# Patient Record
Sex: FEMALE | Race: BLACK OR AFRICAN AMERICAN | Employment: PART TIME | URBAN - METROPOLITAN AREA
[De-identification: names, ages, dates, MRNs, and addresses within clinical notes are randomized per-mention and may not be internally consistent; named-entity substitution may affect disease eponyms.]

---

## 2019-10-07 ENCOUNTER — OFFICE VISIT (OUTPATIENT)
Dept: SURGERY | Age: 29
End: 2019-10-07

## 2019-10-07 VITALS
DIASTOLIC BLOOD PRESSURE: 84 MMHG | BODY MASS INDEX: 43.47 KG/M2 | RESPIRATION RATE: 20 BRPM | WEIGHT: 277 LBS | SYSTOLIC BLOOD PRESSURE: 145 MMHG | HEIGHT: 67 IN | HEART RATE: 87 BPM | TEMPERATURE: 97.2 F

## 2019-10-07 DIAGNOSIS — E11.9 CONTROLLED TYPE 2 DIABETES MELLITUS WITHOUT COMPLICATION, WITH LONG-TERM CURRENT USE OF INSULIN (HCC): ICD-10-CM

## 2019-10-07 DIAGNOSIS — E66.01 MORBID OBESITY (HCC): Primary | ICD-10-CM

## 2019-10-07 DIAGNOSIS — Z79.4 CONTROLLED TYPE 2 DIABETES MELLITUS WITHOUT COMPLICATION, WITH LONG-TERM CURRENT USE OF INSULIN (HCC): ICD-10-CM

## 2019-10-07 DIAGNOSIS — I10 ESSENTIAL HYPERTENSION: ICD-10-CM

## 2019-10-07 RX ORDER — LISINOPRIL 5 MG/1
TABLET ORAL
COMMUNITY
End: 2020-01-13 | Stop reason: CLARIF

## 2019-10-07 RX ORDER — OMEPRAZOLE 20 MG/1
20 TABLET, DELAYED RELEASE ORAL
COMMUNITY
Start: 2019-09-07 | End: 2019-10-07

## 2019-10-07 RX ORDER — METFORMIN HYDROCHLORIDE 500 MG/1
TABLET, EXTENDED RELEASE ORAL
Refills: 3 | COMMUNITY
Start: 2019-09-16

## 2019-10-07 RX ORDER — INSULIN LISPRO 100 [IU]/ML
INJECTION, SOLUTION INTRAVENOUS; SUBCUTANEOUS
COMMUNITY
Start: 2019-05-14 | End: 2019-10-07

## 2019-10-07 RX ORDER — LISINOPRIL 10 MG/1
TABLET ORAL
COMMUNITY
Start: 2018-05-09 | End: 2019-10-07

## 2019-10-07 RX ORDER — FLUCONAZOLE 150 MG/1
TABLET ORAL
COMMUNITY
End: 2019-10-07

## 2019-10-07 RX ORDER — ALBUTEROL SULFATE 0.83 MG/ML
SOLUTION RESPIRATORY (INHALATION)
COMMUNITY

## 2019-10-07 RX ORDER — GABAPENTIN 100 MG/1
CAPSULE ORAL
Refills: 5 | COMMUNITY
Start: 2019-09-19 | End: 2020-11-05 | Stop reason: DRUGHIGH

## 2019-10-07 RX ORDER — IBUPROFEN 200 MG
200 TABLET ORAL
COMMUNITY
End: 2019-10-07

## 2019-10-07 RX ORDER — INSULIN GLARGINE 100 [IU]/ML
INJECTION, SOLUTION SUBCUTANEOUS
Refills: 5 | COMMUNITY
Start: 2019-09-18

## 2019-10-07 RX ORDER — BUPROPION HYDROCHLORIDE 300 MG/1
TABLET ORAL
COMMUNITY
End: 2019-10-07

## 2019-10-07 RX ORDER — IPRATROPIUM BROMIDE AND ALBUTEROL SULFATE 2.5; .5 MG/3ML; MG/3ML
3 SOLUTION RESPIRATORY (INHALATION)
COMMUNITY
Start: 2017-12-28 | End: 2019-10-07

## 2019-10-07 NOTE — LETTER
10/7/19 Patient: Julio César Casas  
YOB: 1990 Date of Visit: 10/7/2019 Marshal Felix MD 
[address] [place] [address] [address] [place] [address] VIA Facsimile: [phone] Dear Marshal Felix MD, Thank you for referring Ms. Julio César Casas to [facility] for [facility]. My notes for this consultation are attached. If you have questions, please do not hesitate to call me. I look forward to following your patient along with you.  
 
 
Sincerely, 
 
Ana Luevano MD

## 2019-10-07 NOTE — PROGRESS NOTES
Deborah Browne is a 26 y.o. female who presents today with   Chief Complaint   Patient presents with    Morbid Obesity     Consult        Body mass index is 43.38 kg/m². 1. Have you been to the ER, urgent care clinic since your last visit? Hospitalized since your last visit? No    2. Have you seen or consulted any other health care providers outside of the [address] [place] [address] since your last visit? Include any pap smears or colon screening.  No

## 2019-10-07 NOTE — PROGRESS NOTES
Initial Consultation for Bariatric Surgery Template (Gastric Bypass)    Jose Buenrostro is a 26 y.o. female who comes into the office today for initial consultation for the surgical options for the treatment of morbid obesity. The patient initially identified obesity at the age of 14 and at age 24 weighed 0 lbs. She has tried a variety of unsupervised weight-loss attempts including self imposed, but has yet to meet with lasting success. Maximum weight lost on a diet is about 5 lbs, but that the weight loss always seems to return. Today, the patient is  Height: 5' 7\" (170.2 cm) tall, Weight: 125.6 kg (277 lb) lbs for a Body mass index is 43.38 kg/m². It is due to the patient's severe obesity, which is further complicated by adult onset diabetes mellitus, hypertension, reactive airway disease and neuropathy  that the patient is now seeking out bariatric surgery, specifically, gastric  bypass.     Past Medical History:   Diagnosis Date    Asthma     Diabetes ([address]     Hypertension     Neuropathy        Past Surgical History:   Procedure Laterality Date    HX  SECTION         Current Outpatient Medications on File Prior to Visit   Medication Sig Dispense Refill    albuterol (PROVENTIL VENTOLIN) 2.5 mg /3 mL (0.083 %) nebu albuterol sulfate 2.5 mg/3 mL (0.083 %) solution for nebulization      gabapentin (NEURONTIN) 100 mg capsule TAKE ONE CAPSULE BY MOUTH 3 TIMES A DAY  5    LANTUS SOLOSTAR U-100 INSULIN 100 unit/mL (3 mL) inpn INJECT 20 UNITS IN THE MORNING AND 30 UNITS IN THE EVENING  5    lisinopril (PRINIVIL, ZESTRIL) 5 mg tablet lisinopril 5 mg tablet      metFORMIN ER (GLUCOPHAGE XR) 500 mg tablet TAKE 2 TABLETS BY MOUTH TWICE A DAY  3    buPROPion XL (WELLBUTRIN XL) 300 mg XL tablet bupropion HCl  mg 24 hr tablet, extended release      dulaglutide (TRULICITY) 0.33 XS/1.2 mL sub-q pen Trulicity 1.96 HT/5.9 mL subcutaneous pen injector      insulin lispro (HUMALOG U-100 INSULIN) 100 unit/mL injection Humalog U-100 Insulin 100 unit/mL subcutaneous solution      lisinopril (PRINIVIL, ZESTRIL) 10 mg tablet lisinopril 10 mg tablet       No current facility-administered medications on file prior to visit.         Allergies   Allergen Reactions    Shellfish Derived Anaphylaxis       Social History     Tobacco Use    Smoking status: Former Smoker     Last attempt to quit: 10/1/2018     Years since quittin.0    Smokeless tobacco: Never Used   Substance Use Topics    Alcohol use: Not Currently    Drug use: Not on file       Family History   Problem Relation Age of Onset    Diabetes Mother     Hypertension Mother    [facility] Diabetes Father     Hypertension Father        Family Status   Relation Name Status    Mother  Alive    Father  Alive       Review of Systems:  Positive in BOLD    CONST: Fever, weight loss, fatigue or chills  GI: Nausea, vomiting, abdominal pain, change in bowel habits, hematochezia, melena, and GERD   INTEG: Dermatitis, abnormal moles  HEENT: Recent changes in vision, vertigo, epistaxis, dysphagia and hoarseness  CV: Chest pain, palpitations, HTN, edema and varicosities  RESP: Cough, shortness of breath, wheezing, hemoptysis, snoring and reactive airway disease  : Hematuria, dysuria, frequency, urgency, nocturia and stress urinary incontinence   MS: Weakness, joint pain and arthritis  ENDO: Diabetes, thyroid disease, polyuria, polydipsia, polyphagia, poor wound healing, heat intolerance, cold intolerance  LYMPH/HEME: Anemia, bruising and history of blood transfusions  NEURO: Dizziness, headache, fainting, seizures and stroke, neuropathy  PSYCH: Anxiety and depression      Physical Exam    Visit Vitals  /84 (BP 1 Location: Left arm, BP Patient Position: At rest)   Pulse 87   Temp 97.2 °F (36.2 °C) (Oral)   Resp 20   Ht 5' 7\" (1.702 m)   Wt 125.6 kg (277 lb)   BMI 43.38 kg/m²       Pre op weight: 277  EBW: 137  Wt loss to date: 0       General: 26 y.o.) female in no acute distress. Morbidly obese in abdomen - mixed pattern  HEENT: Normocephalic, atraumatic, Pupils equal and reactive, nasopharynx clear, oropharynx clear and moist without lesions  NECK: Supple, no lymphadenopathy, thyromegaly, carotid bruits or jugular venous distension. trachea midline  RESP: Clear to auscultation bilaterally, no wheezes, rhonchi, or rales, normal respiratory excursion  CV: Regular rate and rhythm, no murmurs, rubs or gallops. 3+/4 pulses in bilateral dorsalis pedis and posterior tibialis. No distal edema or varicosities. ABD: Soft, nontender, nondistended, normoactive bowel sounds, no hernias, no hepatosplenomegaly, easily palpable costal margins, gynecoid distribution, healed pfannenstiel incision   Extremities: Warm, well perfused, no tenderness or swelling, normal gait/station  Neuro: Sensation and strength grossly intact and symmetrical  Psych: Alert and oriented to person, place, and time. Impression:    Deborah Browne is a 26 y.o. female who is suffering from morbid obesity with a BMI of 43  and comorbidities including adult onset diabetes mellitus, hypertension and reactive airway disease  who would benefit from bariatric surgery. We have had an extensive discussion with regard to the risks, benefits and likely outcomes of the operation. We've discussed the restrictive and malabsorptive nature of the gastric bypass and compared and contrasted with the sleeve gastrectomy. The patient understands the likelihood of losing approximately 80% of their excess weight in 12 to 18 months. She also understands the risks including but not limited to bleeding, infection, need for reoperation, ulcers, leaks and strictures, bowel obstruction secondary to adhesions and internal hernias, DVT, PE, heart attack, stroke, and death. Patient also understands risks of inadequate weight loss, excess weight loss, vitamin insufficiency, protein malnutrition, excess skin, and loss of hair.   We have reviewed the components of a successful postoperative course including requirement for a high protein, low carbohydrate diet, 60 oz a day of zero calorie liquids, daily vitamin supplementation, daily exercise, regular follow-up, and participation in support groups. At this time we will enroll the patient in our bariatric program, undertake routine laboratory evaluation, chest X-ray, EKG, possible UGI and evaluation by  nutritionist as well as psychologist and pending their satisfactory completion of the preop evaluation, plan to perform a gastric bypass.

## 2019-10-29 ENCOUNTER — DOCUMENTATION ONLY (OUTPATIENT)
Dept: SURGERY | Age: 29
End: 2019-10-29

## 2019-10-29 NOTE — PROGRESS NOTES
[facility] [place] [facility] [address] [facility]  [id number] [place] [address] 88  [place], [address] [place] [address]    Patient's Name: Jadiel Green   Age: 26 y.o. YOB: 1990   Sex: female    Date:  10/09/2019    Session: 1 of  6  Surgeon:  Dr. Rosa Dougherty    Height: 5'7\" Weight:    275      Lbs. BMI: 43     Starting Weight: 277       Lbs. Do you smoke? No    Alcohol intake:    I do not drink at all. Class Guidelines    Guidelines are reviewed with patient at the start of every class. 1. Patient understands that weight loss trial classes must be consecutive. Patient understands if they miss a class, it is their responsibility to contact me to reschedule class. I will reach out to patient after their first no show. 2.  Patient understands the expectations that weight maintenance/weight loss is expected during the classes. Failure to demonstrate changes may result in one extra month of weight loss trial, followed by going back to see the surgeon. 3. Patient is also instructed to be doing their labs, blood work, psych visit, support group and any other test that the surgeon has used while they are working on their weight loss trial.  4. Patient is instructed to bring their education binder to all classes. Changes Made Since Last Class:   I have increased my activity. Eating Habits and Behaviors      Today we reviewed key diet principles. We talked about patient following a low calorie/low carbohydrate diet while they are in weight loss trials. To achieve this, patient is encouraged to avoid liquid calories, including alcohol, soda, sweet tea, and fruit juices. Patient can cut carbohydrates by trying to stick to meat and vegetables. Patient can also eat eggs, cheese, and good fat, while trying to eliminate starches, such as pasta, rice, crackers, chips, popcorn.     Some of the food-related suggestions included drinking plenty of water or calorie-free beverages prior to their meal.  Patient is encouraged to to fill up on protein first, which is the ultimate fill-me up food. We talked about the importance of eating breakfast and the effects that can happen if one skips meals, which includes eating larger portions, snacking more, and decreasing their metabolism. With the suggestions in the power point, patient will be able to decrease their calories and carbohydrate intake. Patient's current diet habits include:   3 meals consisting of eggs, sausage, cheese, hot dogs, chicken, vegetables. Physical Activity/Exercise    Comments: We talked about the importance of establishing a work out routine. Patient is currently doing treadmill walking for activity. Behavior Modification       Comments:   Some of the behavior tips that were included in the power point, include being choosy about night time snacking. Patient was encouraged to make the TV a no eating zone and not eat after 7 pm.  Patient is also encouraged to keep a food journal.      I also reminded all patients to make their psychologist appointment and attend at least 1 support group. Goals for the next month include: Increase activity to three times per week. Decrease portion size and decrease high sugar cake eating.     Mary Young RD

## 2019-11-18 ENCOUNTER — OFFICE VISIT (OUTPATIENT)
Dept: SURGERY | Age: 29
End: 2019-11-18

## 2019-11-18 VITALS
DIASTOLIC BLOOD PRESSURE: 76 MMHG | SYSTOLIC BLOOD PRESSURE: 137 MMHG | HEIGHT: 67 IN | HEART RATE: 95 BPM | WEIGHT: 274 LBS | TEMPERATURE: 97.4 F | OXYGEN SATURATION: 98 % | BODY MASS INDEX: 43 KG/M2

## 2019-11-18 DIAGNOSIS — E66.01 MORBID OBESITY (HCC): Primary | ICD-10-CM

## 2019-11-18 DIAGNOSIS — E11.9 CONTROLLED TYPE 2 DIABETES MELLITUS WITHOUT COMPLICATION, WITH LONG-TERM CURRENT USE OF INSULIN (HCC): ICD-10-CM

## 2019-11-18 DIAGNOSIS — I10 ESSENTIAL HYPERTENSION: ICD-10-CM

## 2019-11-18 DIAGNOSIS — Z79.4 CONTROLLED TYPE 2 DIABETES MELLITUS WITHOUT COMPLICATION, WITH LONG-TERM CURRENT USE OF INSULIN (HCC): ICD-10-CM

## 2019-11-18 RX ORDER — INSULIN LISPRO 100 [IU]/ML
INJECTION, SOLUTION INTRAVENOUS; SUBCUTANEOUS
COMMUNITY

## 2019-11-18 NOTE — PROGRESS NOTES
Johnnie Paul is a 26 y.o. female (: 1990) presenting to address:    Chief Complaint   Patient presents with    Weight Management     GBP       Medication list and allergies have been reviewed with Johnnie Palu and updated as of today's date. I have gone over all Medical, Surgical and Social History with Johnnie Paul and updated/added the information accordingly. 1. Have you been to the ER, Urgent Care or Hospitalized since your last visit? Yes. [facility] for UTI        2. Have you followed up with your PCP or any other Physicians since your procedure/ last office visit?    YES

## 2019-11-25 ENCOUNTER — DOCUMENTATION ONLY (OUTPATIENT)
Dept: SURGERY | Age: 29
End: 2019-11-25

## 2019-11-25 NOTE — PROGRESS NOTES
Bariatric Preoperative Progress Note      Subjective:     Karlo Lo is a 26 y.o. female who presents today for followup of their candidacy for bariatric surgery. It is due to the patient's severe obesity, which is further complicated by adult onset diabetes mellitus, hypertension, reactive airway disease and neuropathy  that the patient is now seeking out bariatric surgery. Since last seen, Karlo Lo has been working through bariatric program towards gastric bypass. Past Medical History:   Diagnosis Date    Asthma     Diabetes ([address]     Hypertension     Neuropathy        Past Surgical History:   Procedure Laterality Date    HX  SECTION         Current Outpatient Medications   Medication Sig Dispense Refill    insulin lispro (HUMALOG U-100 INSULIN) 100 unit/mL injection by SubCUTAneous route.       albuterol (PROVENTIL VENTOLIN) 2.5 mg /3 mL (0.083 %) nebu albuterol sulfate 2.5 mg/3 mL (0.083 %) solution for nebulization      gabapentin (NEURONTIN) 100 mg capsule TAKE ONE CAPSULE BY MOUTH 3 TIMES A DAY  5    LANTUS SOLOSTAR U-100 INSULIN 100 unit/mL (3 mL) inpn INJECT 20 UNITS IN THE MORNING AND 30 UNITS IN THE EVENING  5    metFORMIN ER (GLUCOPHAGE XR) 500 mg tablet TAKE 2 TABLETS BY MOUTH TWICE A DAY  3    lisinopril (PRINIVIL, ZESTRIL) 5 mg tablet lisinopril 5 mg tablet         Allergies   Allergen Reactions    Shellfish Derived Anaphylaxis       Review of Systems:  Positive in BOLD     CONST: Fever, weight loss, fatigue or chills  GI: Nausea, vomiting, abdominal pain, change in bowel habits, hematochezia, melena, and GERD   INTEG: Dermatitis, abnormal moles  HEENT: Recent changes in vision, vertigo, epistaxis, dysphagia and hoarseness  CV: Chest pain, palpitations, HTN, edema and varicosities  RESP: Cough, shortness of breath, wheezing, hemoptysis, snoring and reactive airway disease  : Hematuria, dysuria, frequency, urgency, nocturia and stress urinary incontinence   MS: Weakness, joint pain and arthritis  ENDO: Diabetes, thyroid disease, polyuria, polydipsia, polyphagia, poor wound healing, heat intolerance, cold intolerance  LYMPH/HEME: Anemia, bruising and history of blood transfusions  NEURO: Dizziness, headache, fainting, seizures and stroke, neuropathy  PSYCH: Anxiety and depression       Objective:     Physical Exam:  Visit Vitals  /76 (BP 1 Location: Right arm, BP Patient Position: Sitting)   Pulse 95   Temp 97.4 °F (36.3 °C) (Oral)   Ht 5' 7\" (1.702 m)   Wt 124.3 kg (274 lb)   LMP 11/14/2019   SpO2 98%   BMI 42.91 kg/m²       Physical Exam    Studies to date:    Labs:needs to be preformed/obtained       EKG: needs to be preformed/obtained       Nutritional evaluation: 1/6     Psychiatric evaluation:pending     Support Group: needs to be preformed/obtained       Additional evaluations:  N/a at this time     Assessment:   Nadir Ackerman is a 26 y.o. female who is progressing through the bariatric preoperative evaluation. Ms. Jammie Marshall has a reminder for a \"due or due soon\" health maintenance. I have asked that she contact her primary care provider for follow-up on this health maintenance. Plan:   -complete remainder of preop evaluation, as above   - patient communicates understanding that the expectation is to lose or maintain weight during WLT. Weight gain may result in delay or cancellation of surgery.   -Follow up once has completed entirety of weight loss workup to determine next steps.         Follow up Juan Antonio    >50% of 30 min visit spent counseling     DAYAN Graham-BC

## 2019-11-25 NOTE — PROGRESS NOTES
[facility]  [address], [address] [facility]    Patient's Name: Luh López   Age: 26 y.o. YOB: 1990   Sex: female    Date:  11/13/2019    Session: 2 of  6  Surgeon:  Dr. Joe Damon    Height: 5'7\" Weight:    275  Lbs. BMI: 42       Starting Weight:  277 Lbs. Do you smoke? no    Alcohol intake:    I do not drink at all. Class Guidelines    Guidelines are reviewed with patient at the start of every class. 1. Patient understands that weight loss trial classes must be consecutive. Patient understands if they miss a class, it is their responsibility to contact me to reschedule class. I will reach out to patient after their first no show. 2.  Patient understands the expectations that weight maintenance/weight loss is expected during the classes. Failure to demonstrate changes may result in one extra month of weight loss trial, followed by going back to see the surgeon. 3. Patient is also instructed to be doing their labs, blood work, psych visit, support group and any other test that the surgeon has used while they are working on their weight loss trial.        Changes Made Since Last Class:   Fewer carbs, more protein , more water. Dietary Instruction    During today's class we continued to focus on the key diet principles. Patient was instructed to follow a low carbohydrate diet, focusing on meat and vegetables. Patient was instructed to stop liquid calories and aim for 64 ounces of water per day. In class, I also gave patient a power point on surviving the holidays. Some of the tips included survival tips for parties, including bringing their own low carbohydrate dish to a potluck dinner and surveying the buffet line before they start filling up their plate.   Patient was given cooking alternatives, including using Splenda or Stevia for sugar, substituting applesauce for oil in recipes, and using low fat plain yogurt instead of sour cream in dips. Patient was also encouraged to be mindful of calories in alcohol. Patient's diet habits include:   3 meals consisting of protein shake, egg, tuna, chicken, vegetables. Physical Activity/Exercise    Patient is currently doing more walking on treadmill for activity. Today's power point on surviving the holidays also included tips on exercising. This included being creative during the holiday, walking stairs, mall walking, getting resistance bands. Patient was encouraged not to be afraid to excuse themselves from the table to go for a walk after they eat. Behavior Modification    Reinforced behavior changes to make. Patient was encouraged to keep their emotions in check. Try to HALT and focus on whether they are eating out of hunger or if they are eating out of emotions. Other eating behaviors included surveying the buffet line before starting to fill up their plate. Goals that patient set for next month include: Increase exercise routine to include 45 minutes 3x per week. Eat healthier and make better food choices by eating smaller portions.        Carl Das RD

## 2019-12-31 ENCOUNTER — DOCUMENTATION ONLY (OUTPATIENT)
Dept: SURGERY | Age: 29
End: 2019-12-31

## 2019-12-31 NOTE — PROGRESS NOTES
[employer] [facility] [place]                                                                  [facility]                                                               [address] [place] [address] [facility]                                                                   [place], [address] [place] [address]      Name: Kellie Magdaleno  : 1990      Date: 2019         Session:  3 of  6   Surgeon:   Dr. Walterine Sever     Height: 5'7\"   Weight:    270      Lbs. BMI: 42         Starting Weight: 277     Do you smoke? no    Alcohol intake:    Patient does not drink. Class Guidelines    Guidelines are reviewed with patient at the start of every class. 1. Patient understands that weight loss trial classes must be consecutive. Patient understands if they miss a class, it is their responsibility to contact me to reschedule class. I will reach out to patient after their first no show. 2.  Patient understands the expectations that weight maintenance/weight loss is expected during the classes. Failure to demonstrate changes may result in one extra month of weight loss trial, followed by going back to see the surgeon. Patient understands that they CAN NOT gain any weight during the weight loss trial.  Gaining weight will result in extra classes. 3. Patient is also instructed to be doing their labs, blood work, psych visit, support group and any other test that the surgeon has used while they are working on their weight loss trial.  4.  Patient was instructed to bring their blue binder to every class and appointment. Changes Made Since Last Class:   Self control over junk food. Eating Habits and Behaviors    Today in class, we reviewed the key diet principles. Specifically, patient was instructed to watch their carbohydrate intake. We talked about foods that have carbohydrates in them and alternatives in place of this.   Patient was encouraged to start cutting out bread, rice, pasta, and other starches. Patient is encouraged to work towards 64 ounces of fluid per day, avoiding soda, sweet tea, and fruit juices. We also discussed how to make the best choices when eating out. We looked at fast food traps and dining out strategies to stay in control. Patient was also given ideas from various restaurants that would be a healthier option. We also discussed vitamin protocol if patient pursues surgical weight loss. Patient's current diet habits include:  3 meals consisting of eggs, protein shake, chicken and vegetables. Physical Activity/Exercise    Comments: We talked about exercise. Patient was given reasons of why exercise is so important and how that can help with their long-term success. I have encouraged patient to get a support system to help with the activity. Currently for activity, patient is doing more walking. Behavior Modification       Comments:  I have also talked to patient about some behavior changes including taking 20-30 minutes to eat a meal.  Patient is encouraged to get a timer and use smaller utensils to help them stretch their meal out. Patient is also encouraged to get into a routine of not eating after 7 pm and make the TV a no eating zone. Goals that patient wants to work on includes:  1. Increase activity. 2. Decrease carbohydrate intake and portions sizes.       Garima Mora RD

## 2020-01-02 ENCOUNTER — HOSPITAL ENCOUNTER (OUTPATIENT)
Dept: PREADMISSION TESTING | Age: 30
Discharge: HOME | End: 2020-01-02
Payer: MEDICAID

## 2020-01-02 ENCOUNTER — HOSPITAL ENCOUNTER (OUTPATIENT)
Dept: LAB | Age: 30
Discharge: HOME | End: 2020-01-02
Payer: MEDICAID

## 2020-01-02 DIAGNOSIS — I10 ESSENTIAL HYPERTENSION: ICD-10-CM

## 2020-01-02 DIAGNOSIS — E66.01 MORBID OBESITY (HCC): ICD-10-CM

## 2020-01-02 DIAGNOSIS — E11.9 CONTROLLED TYPE 2 DIABETES MELLITUS WITHOUT COMPLICATION, WITH LONG-TERM CURRENT USE OF INSULIN (HCC): ICD-10-CM

## 2020-01-02 DIAGNOSIS — Z79.4 CONTROLLED TYPE 2 DIABETES MELLITUS WITHOUT COMPLICATION, WITH LONG-TERM CURRENT USE OF INSULIN (HCC): ICD-10-CM

## 2020-01-02 LAB
25(OH)D3 SERPL-MCNC: 18.6 NG/ML (ref 30–100)
ALBUMIN SERPL-MCNC: 3.5 G/DL (ref 3.4–5)
ANION GAP SERPL CALC-SCNC: 6 MMOL/L (ref 3–18)
ATRIAL RATE: 86 BPM
BASOPHILS # BLD: 0 K/UL (ref 0–0.1)
BASOPHILS NFR BLD: 0 % (ref 0–2)
BUN SERPL-MCNC: 11 MG/DL (ref 7–18)
BUN/CREAT SERPL: 15 (ref 12–20)
CALCIUM SERPL-MCNC: 9.1 MG/DL (ref 8.5–10.1)
CALCULATED P AXIS, ECG09: 53 DEGREES
CALCULATED R AXIS, ECG10: 52 DEGREES
CALCULATED T AXIS, ECG11: 17 DEGREES
CHLORIDE SERPL-SCNC: 105 MMOL/L (ref 100–111)
CO2 SERPL-SCNC: 27 MMOL/L (ref 21–32)
CREAT SERPL-MCNC: 0.72 MG/DL (ref 0.6–1.3)
DIAGNOSIS, 93000: NORMAL
DIFFERENTIAL METHOD BLD: ABNORMAL
EOSINOPHIL # BLD: 0.3 K/UL (ref 0–0.4)
EOSINOPHIL NFR BLD: 4 % (ref 0–5)
ERYTHROCYTE [DISTWIDTH] IN BLOOD BY AUTOMATED COUNT: 15.7 % (ref 11.6–14.5)
EST. AVERAGE GLUCOSE BLD GHB EST-MCNC: 232 MG/DL
FOLATE SERPL-MCNC: 13.5 NG/ML (ref 3.1–17.5)
GLUCOSE SERPL-MCNC: 155 MG/DL (ref 74–99)
HBA1C MFR BLD: 9.7 % (ref 4.2–5.6)
HCT VFR BLD AUTO: 39.1 % (ref 35–45)
HGB BLD-MCNC: 11.4 G/DL (ref 12–16)
IRON SERPL-MCNC: 92 UG/DL (ref 50–175)
LYMPHOCYTES # BLD: 2.3 K/UL (ref 0.9–3.6)
LYMPHOCYTES NFR BLD: 31 % (ref 21–52)
MCH RBC QN AUTO: 22.7 PG (ref 24–34)
MCHC RBC AUTO-ENTMCNC: 29.2 G/DL (ref 31–37)
MCV RBC AUTO: 77.9 FL (ref 74–97)
MONOCYTES # BLD: 0.4 K/UL (ref 0.05–1.2)
MONOCYTES NFR BLD: 5 % (ref 3–10)
NEUTS SEG # BLD: 4.5 K/UL (ref 1.8–8)
NEUTS SEG NFR BLD: 60 % (ref 40–73)
P-R INTERVAL, ECG05: 130 MS
PLATELET # BLD AUTO: 389 K/UL (ref 135–420)
PMV BLD AUTO: 10.9 FL (ref 9.2–11.8)
POTASSIUM SERPL-SCNC: 4.1 MMOL/L (ref 3.5–5.5)
Q-T INTERVAL, ECG07: 342 MS
QRS DURATION, ECG06: 76 MS
QTC CALCULATION (BEZET), ECG08: 409 MS
RBC # BLD AUTO: 5.02 M/UL (ref 4.2–5.3)
SODIUM SERPL-SCNC: 138 MMOL/L (ref 136–145)
TSH SERPL DL<=0.05 MIU/L-ACNC: 1.16 UIU/ML (ref 0.36–3.74)
VENTRICULAR RATE, ECG03: 86 BPM
VIT B12 SERPL-MCNC: 470 PG/ML (ref 211–911)
WBC # BLD AUTO: 7.5 K/UL (ref 4.6–13.2)

## 2020-01-02 PROCEDURE — 80048 BASIC METABOLIC PNL TOTAL CA: CPT

## 2020-01-02 PROCEDURE — 84425 ASSAY OF VITAMIN B-1: CPT

## 2020-01-02 PROCEDURE — 82040 ASSAY OF SERUM ALBUMIN: CPT

## 2020-01-02 PROCEDURE — 83540 ASSAY OF IRON: CPT

## 2020-01-02 PROCEDURE — 93005 ELECTROCARDIOGRAM TRACING: CPT

## 2020-01-02 PROCEDURE — 83036 HEMOGLOBIN GLYCOSYLATED A1C: CPT

## 2020-01-02 PROCEDURE — 82607 VITAMIN B-12: CPT

## 2020-01-02 PROCEDURE — 82306 VITAMIN D 25 HYDROXY: CPT

## 2020-01-02 PROCEDURE — 84443 ASSAY THYROID STIM HORMONE: CPT

## 2020-01-02 PROCEDURE — 85025 COMPLETE CBC W/AUTO DIFF WBC: CPT

## 2020-01-02 PROCEDURE — 36415 COLL VENOUS BLD VENIPUNCTURE: CPT

## 2020-01-02 PROCEDURE — 86677 HELICOBACTER PYLORI ANTIBODY: CPT

## 2020-01-03 LAB — H PYLORI IGM SER-ACNC: 12.1 UNITS (ref 0–8.9)

## 2020-01-04 LAB — VIT B1 BLD-SCNC: 101.4 NMOL/L (ref 66.5–200)

## 2020-01-06 LAB — H PYLORI IGG SER IA-ACNC: 0.46 INDEX VALUE (ref 0–0.79)

## 2020-01-13 ENCOUNTER — OFFICE VISIT (OUTPATIENT)
Dept: SURGERY | Age: 30
End: 2020-01-13

## 2020-01-13 VITALS
HEART RATE: 89 BPM | TEMPERATURE: 98.1 F | SYSTOLIC BLOOD PRESSURE: 152 MMHG | BODY MASS INDEX: 43.32 KG/M2 | DIASTOLIC BLOOD PRESSURE: 80 MMHG | HEIGHT: 67 IN | RESPIRATION RATE: 20 BRPM | WEIGHT: 276 LBS

## 2020-01-13 DIAGNOSIS — I10 ESSENTIAL HYPERTENSION: ICD-10-CM

## 2020-01-13 DIAGNOSIS — A04.8 H. PYLORI INFECTION: ICD-10-CM

## 2020-01-13 DIAGNOSIS — E55.9 VITAMIN D DEFICIENCY: ICD-10-CM

## 2020-01-13 DIAGNOSIS — E66.01 MORBID OBESITY (HCC): Primary | ICD-10-CM

## 2020-01-13 DIAGNOSIS — Z79.4 CONTROLLED TYPE 2 DIABETES MELLITUS WITHOUT COMPLICATION, WITH LONG-TERM CURRENT USE OF INSULIN (HCC): ICD-10-CM

## 2020-01-13 DIAGNOSIS — E11.9 CONTROLLED TYPE 2 DIABETES MELLITUS WITHOUT COMPLICATION, WITH LONG-TERM CURRENT USE OF INSULIN (HCC): ICD-10-CM

## 2020-01-13 RX ORDER — IPRATROPIUM BROMIDE AND ALBUTEROL SULFATE 2.5; .5 MG/3ML; MG/3ML
3 SOLUTION RESPIRATORY (INHALATION)
COMMUNITY
Start: 2017-12-28 | End: 2020-01-13 | Stop reason: CLARIF

## 2020-01-13 RX ORDER — CITALOPRAM 20 MG/1
TABLET, FILM COATED ORAL
COMMUNITY
Start: 2020-01-04 | End: 2020-11-05 | Stop reason: DRUGHIGH

## 2020-01-13 RX ORDER — OMEPRAZOLE 20 MG/1
TABLET, DELAYED RELEASE ORAL
COMMUNITY
End: 2020-11-05 | Stop reason: DRUGHIGH

## 2020-01-13 RX ORDER — EXENATIDE 2 MG/.65ML
INJECTION, SUSPENSION, EXTENDED RELEASE SUBCUTANEOUS
COMMUNITY
Start: 2020-01-08

## 2020-01-13 RX ORDER — LOSARTAN POTASSIUM 100 MG/1
TABLET ORAL
COMMUNITY
Start: 2020-01-04

## 2020-01-13 NOTE — PATIENT INSTRUCTIONS
Vitamin D deficiency: Start Vit D 3 10,000 units daily PO    A1C goal 8 prior to sx       Recent Results (from the past 336 hour(s))   ALBUMIN    Collection Time: 01/02/20 11:17 AM   Result Value Ref Range    Albumin 3.5 3.4 - 5.0 g/dL   CBC WITH AUTOMATED DIFF    Collection Time: 01/02/20 11:17 AM   Result Value Ref Range    WBC 7.5 4.6 - 13.2 K/uL    RBC 5.02 4.20 - 5.30 M/uL    HGB 11.4 (L) 12.0 - 16.0 g/dL    HCT 39.1 35.0 - 45.0 %    MCV 77.9 74.0 - 97.0 FL    MCH 22.7 (L) 24.0 - 34.0 PG    MCHC 29.2 (L) 31.0 - 37.0 g/dL    RDW 15.7 (H) 11.6 - 14.5 %    PLATELET 331 748 - 399 K/uL    MPV 10.9 9.2 - 11.8 FL    NEUTROPHILS 60 40 - 73 %    LYMPHOCYTES 31 21 - 52 %    MONOCYTES 5 3 - 10 %    EOSINOPHILS 4 0 - 5 %    BASOPHILS 0 0 - 2 %    ABS. NEUTROPHILS 4.5 1.8 - 8.0 K/UL    ABS. LYMPHOCYTES 2.3 0.9 - 3.6 K/UL    ABS. MONOCYTES 0.4 0.05 - 1.2 K/UL    ABS. EOSINOPHILS 0.3 0.0 - 0.4 K/UL    ABS.  BASOPHILS 0.0 0.0 - 0.1 K/UL    DF AUTOMATED     H PYLORI AB, IGG, QT    Collection Time: 01/02/20 11:17 AM   Result Value Ref Range    H. pylori Ab, IgG 0.46 0.00 - 0.79 Index Value   H PYLORI AB, IGM    Collection Time: 01/02/20 11:17 AM   Result Value Ref Range    H. pylori Ab, IgM 12.1 (H) 0.0 - 8.9 units   HEMOGLOBIN A1C WITH EAG    Collection Time: 01/02/20 11:17 AM   Result Value Ref Range    Hemoglobin A1c 9.7 (H) 4.2 - 5.6 %    Est. average glucose 232 mg/dL   IRON    Collection Time: 01/02/20 11:17 AM   Result Value Ref Range    Iron 92 50 - 732 ug/dL   METABOLIC PANEL, BASIC    Collection Time: 01/02/20 11:17 AM   Result Value Ref Range    Sodium 138 136 - 145 mmol/L    Potassium 4.1 3.5 - 5.5 mmol/L    Chloride 105 100 - 111 mmol/L    CO2 27 21 - 32 mmol/L    Anion gap 6 3.0 - 18 mmol/L    Glucose 155 (H) 74 - 99 mg/dL    BUN 11 7.0 - 18 MG/DL    Creatinine 0.72 0.6 - 1.3 MG/DL    BUN/Creatinine ratio 15 12 - 20      GFR est AA >60 >60 ml/min/1.73m2    GFR est non-AA >60 >60 ml/min/1.73m2    Calcium 9.1 8.5 - 10.1 MG/DL   TSH 3RD GENERATION    Collection Time: 01/02/20 11:17 AM   Result Value Ref Range    TSH 1.16 0.36 - 3.74 uIU/mL   VITAMIN B1, WHOLE BLOOD    Collection Time: 01/02/20 11:17 AM   Result Value Ref Range    Vitamin B1 101.4 66.5 - 200.0 nmol/L   VITAMIN B12 & FOLATE    Collection Time: 01/02/20 11:17 AM   Result Value Ref Range    Vitamin B12 470 211 - 911 pg/mL    Folate 13.5 3.10 - 17.50 ng/mL   VITAMIN D, 25 HYDROXY    Collection Time: 01/02/20 11:17 AM   Result Value Ref Range    Vitamin D 25-Hydroxy 18.6 (L) 30 - 100 ng/mL   EKG, 12 LEAD, INITIAL    Collection Time: 01/02/20 11:21 AM   Result Value Ref Range    Ventricular Rate 86 BPM    Atrial Rate 86 BPM    P-R Interval 130 ms    QRS Duration 76 ms    Q-T Interval 342 ms    QTC Calculation (Bezet) 409 ms    Calculated P Axis 53 degrees    Calculated R Axis 52 degrees    Calculated T Axis 17 degrees    Diagnosis       Normal sinus rhythm  Cannot rule out Anterior infarct , age undetermined  Abnormal ECG  No previous ECGs available  Confirmed by Altagracia Schuler ([id number]) on 1/2/2020 3:01:07 PM            H. Pylori Bacterial Infection: Care Instructions  Your Care Instructions    Your test shows the presence of Helicobacter pylori ( H. pylori), a kind of bacterium that lives in the lining of the stomach. Many people have H. pylori in their stomachs and do not develop problems. But sometimes H. pylori causes an upset stomach or a sore (ulcer) in the stomach lining. Most stomach ulcers are caused by H. pylori. Symptoms of an ulcer include gnawing or burning pain in the belly that can last minutes or hours. Eating food or taking antacids helps relieve the pain, but the symptoms may come back after a while. Antibiotic medicine can cure an H. pylori infection. Follow-up care is a key part of your treatment and safety. Be sure to make and go to all appointments, and call your doctor if you are having problems.  It's also a good idea to know your test results and keep a list of the medicines you take. How can you care for yourself at home? · Take your antibiotics as directed. Do not stop taking them just because you feel better. You need to take the full course of antibiotics. · If your doctor prescribes other medicine, take it exactly as prescribed. Call your doctor if you think you are having a problem with your medicine. You will get more details on the specific medicine your doctor prescribes. · Eat a healthy, balanced diet. ? Eat smaller meals, and eat more often. Be sure to eat at least three meals a day. ? Avoid heavily spiced or greasy foods. ? Do not drink beverages that have caffeine if they bother your stomach. These include coffee, tea, and soda. · Do not smoke. Smoking slows the healing of your ulcer and can make an ulcer come back. If you need help quitting, talk to your doctor about stop-smoking programs and medicines. These can increase your chances of quitting for good. · Limit how much alcohol you drink. Alcohol can slow healing of an ulcer and can make your symptoms worse. · Wash your hands after going to the bathroom. · Avoid aspirin, ibuprofen, or other anti-inflammatory medicines, because they can irritate the stomach. If you need pain medicine, try acetaminophen (Tylenol). When should you call for help? Call 911 anytime you think you may need emergency care. For example, call if:    · You vomit blood or what looks like coffee grounds.     · Your stools are maroon or very bloody.    Call your doctor now or seek immediate medical care if:    · You have new or worse belly pain.     · You are vomiting.     · Your stools are black and look like tar, or they have streaks of blood.    Watch closely for changes in your health, and be sure to contact your doctor if:    · You do not get better as expected. Where can you learn more? Go to http://tashi-mari.info/. Enter S623 in the search box to learn more about \"H.  Pylori Bacterial Infection: Care Instructions. \"  Current as of: November 7, 2018  Content Version: 12.2  © 1077-7646 [employer]. Care instructions adapted under license by [employer] (which disclaims liability or warranty for this information). If you have questions about a medical condition or this instruction, always ask your healthcare professional. [place] [address] any warranty or liability for your use of this information.

## 2020-01-13 NOTE — PROGRESS NOTES
Tyrone Kline is a 26 y.o. female who presents today with   Chief Complaint   Patient presents with    Morbid Obesity     Mid trial        Body mass index is 43.23 kg/m². 1. Have you been to the ER, urgent care clinic since your last visit? Hospitalized since your last visit? No    2. Have you seen or consulted any other health care providers outside of the [address] [place] [address] since your last visit? Include any pap smears or colon screening.  No

## 2020-01-13 NOTE — PROGRESS NOTES
Bariatric Preoperative Progress Note      Subjective:     Freida Estrada is a 26 y.o. female who presents today for followup of their candidacy for bariatric surgery. It is due to the patient's severe obesity, which is further complicated by adult onset diabetes mellitus, hypertension, reactive airway disease and neuropathy  that the patient is now seeking out bariatric surgery. Since last seen, Freida Estrada has been working through bariatric program towards gastric bypass. Past Medical History:   Diagnosis Date    Anxiety     Asthma     Diabetes ([address]     Hypertension     Neuropathy        Past Surgical History:   Procedure Laterality Date    HX  SECTION         Current Outpatient Medications   Medication Sig Dispense Refill    BYDUREON 2 mg/0.65 mL pnij       losartan (COZAAR) 100 mg tablet       omeprazole (PRILOSEC OTC) 20 mg tablet Prilosec OTC 20 mg tablet,delayed release   TAKE 1 TABLET BY MOUTH ONCE A DAY      insulin lispro (HUMALOG U-100 INSULIN) 100 unit/mL injection by SubCUTAneous route.       albuterol (PROVENTIL VENTOLIN) 2.5 mg /3 mL (0.083 %) nebu albuterol sulfate 2.5 mg/3 mL (0.083 %) solution for nebulization      gabapentin (NEURONTIN) 100 mg capsule TAKE ONE CAPSULE BY MOUTH 3 TIMES A DAY  5    LANTUS SOLOSTAR U-100 INSULIN 100 unit/mL (3 mL) inpn INJECT 20 UNITS IN THE MORNING AND 30 UNITS IN THE EVENING  5    metFORMIN ER (GLUCOPHAGE XR) 500 mg tablet TAKE 2 TABLETS BY MOUTH TWICE A DAY  3    citalopram (CELEXA) 20 mg tablet          Allergies   Allergen Reactions    Shellfish Derived Anaphylaxis       Review of Systems:  Positive in BOLD     CONST: Fever, weight loss, fatigue or chills  GI: Nausea, vomiting, abdominal pain, change in bowel habits, hematochezia, melena, and GERD   INTEG: Dermatitis, abnormal moles  HEENT: Recent changes in vision, vertigo, epistaxis, dysphagia and hoarseness  CV: Chest pain, palpitations, HTN, edema and varicosities  RESP: Cough, shortness of breath, wheezing, hemoptysis, snoring and reactive airway disease  : Hematuria, dysuria, frequency, urgency, nocturia and stress urinary incontinence   MS: Weakness, joint pain and arthritis  ENDO: Diabetes, thyroid disease, polyuria, polydipsia, polyphagia, poor wound healing, heat intolerance, cold intolerance  LYMPH/HEME: Anemia, bruising and history of blood transfusions  NEURO: Dizziness, headache, fainting, seizures and stroke, neuropathy  PSYCH: Anxiety and depression       Objective:     Physical Exam:  Visit Vitals  /80 (BP 1 Location: Left arm, BP Patient Position: At rest)   Pulse 89   Temp 98.1 °F (36.7 °C) (Oral)   Resp 20   Ht 5' 7\" (1.702 m)   Wt 125.2 kg (276 lb)   BMI 43.23 kg/m²     Physical Exam  Vitals signs and nursing note reviewed. HENT:      Head: Normocephalic. Cardiovascular:      Rate and Rhythm: Normal rate. Pulmonary:      Effort: Pulmonary effort is normal.   Abdominal:      General: There is no distension. Palpations: Abdomen is soft. Tenderness: There is no tenderness. Musculoskeletal: Normal range of motion. Skin:     General: Skin is warm and dry. Neurological:      Mental Status: She is alert and oriented to person, place, and time. Psychiatric:         Mood and Affect: Affect normal.           Studies to date:    Labs: A1C 9.7, Glu 155, H Pylori +, Vit D def      EKG: Normal sinus rhythm   Cannot rule out Anterior infarct , age undetermined   Abnormal ECG   No previous ECGs available   Confirmed by Roberto Lee ([id number]) on 1/2/2020 3:01:07 PM     Nutritional evaluation: 3/6     Psychiatric evaluation: approved     Support Group: needs to be preformed/obtained     Additional evaluations:  N/a at this time     Assessment:   Anthony Sierra is a 26 y.o. female who is progressing through the bariatric preoperative evaluation.        Plan:   -complete remainder of preop evaluation, as above   - patient communicates understanding that the expectation is to lose or maintain weight during WLT. Weight gain may result in delay or cancellation of surgery.   -Follow up once has completed entirety of weight loss workup to determine next steps.       Will need H Pylori protocol tx   Vitamin D deficiency: Start Vit D 3 10,000 units daily PO  Follow up with PCP and/or Endo for A1C lisy prior to sx, goal 8      >50% of 30 min visit spent counseling     CARRIE CarringtonP-BC

## 2020-01-31 ENCOUNTER — DOCUMENTATION ONLY (OUTPATIENT)
Dept: SURGERY | Age: 30
End: 2020-01-31

## 2020-01-31 NOTE — PROGRESS NOTES
[facility]  [id number] [place] [address]    Patient's Name: Bj Nava   Age: 26 y.o. YOB: 1990   Sex: female    Date:  01/08/2020        Session: 4 of  6    Surgeon:  Dr. Vlado Gongora    Height: 5' 7\"   Weight:    269      Lbs. Starting Weight:  277  Lbs    BMI:  43      Do you smoke? NO    Alcohol intake:    I do not drink at all. Class Guidelines    Guidelines are reviewed with patient at the start of every class. 1. Patient understands that weight loss trial classes must be consecutive. Patient understands if they miss a class, it is their responsibility to contact me to reschedule class. I will reach out to patient after their first no show. 2.  Patient understands the expectations that weight maintenance/weight loss is expected during the classes. Failure to demonstrate changes may result in one extra month of weight loss trial, followed by going back to see the surgeon. 3. Patient is also instructed to be doing their labs, blood work, psych visit, support group and any other test that the surgeon has used while they are working on their weight loss trial.    Changes Made Since Last Class:   Completed a 7 day liquid diet. Eating Habits and Behaviors      Today we reviewed key diet principles. We talked about snack ideas that would focus more on protein. We also talked about the benefits of filling up on protein first and keeping the daily carbohydrate intake to less than 100 grams per day. Patient was instructed to increase fluid intake to 64 ounces per day and stop all carbonation, caffeine, and sugary drinks. During class, we talked about the importance of getting on a routine of eating 3 meals a day, eating within one hour of waking up, and not going longer than 4 hours without fueling the body again. I also talked with patient about some meal ideas.       Patient's current diet habits include:   3 meals consisting of  meats, vegetables, starches and occasional snacks. Physical Activity/Exercise    Comments:     Currently for exercise, patient is walking. We talked about activities for patient to do, including walking, swimming, or chair exercises. Behavior Modification       Comments:   During class, I reviewed a power point with patients called, \"Assessing Your Readiness to Change. \"  During this power point, patient was asked to self-evaluate themselves. At the end, we tallied the scores to determine how ready they are to make changes for the surgery. For the New Year's, I had patient set New Year's resolutions, including a food-related goal, exercise-related goal, and behavior goal.  Patient was encouraged to track the goals on a daily basis using the check off list I provided. Goals should be SMART, specific, measurable, attainable, realistic, and time-orientated. Patient's Goals are:  Behavior-Related Goal: smile more and listen to my body. Food-related goal: cut all sweets. Exercise-related goal: 1 hour a day.       Usama Fraga RD

## 2020-02-17 ENCOUNTER — DOCUMENTATION ONLY (OUTPATIENT)
Dept: SURGERY | Age: 30
End: 2020-02-17

## 2020-02-17 NOTE — PROGRESS NOTES
[facility] [address]  [place], [place]      Patient's Name: Beverly Kelley   Age: 26 y.o. YOB: 1990   Sex: female    Date:   02/05/2020    Insurance:  [employer]          Session: 5 of  6  Surgeon:  Dr. Perry Catalan     Height: 5'7\" Weight:    272      Lbs. Starting Weight: 277#     BMI:  43    Do you smoke? no    Alcohol intake:   I do not drink at all:      Class Guidelines    Guidelines are reviewed with patient at the start of every class. 1. Patient understands that weight loss trial classes must be consecutive. Patient understands if they miss a class, it is their responsibility to contact me to reschedule class. I will reach out to patient after their first no show. 2.  Patient understands the expectations that weight maintenance/weight loss is expected during the classes. Failure to demonstrate changes may result in one extra month of weight loss trial, followed by going back to see the surgeon. 3. Patient is also instructed to be doing their labs, blood work, psych visit, support group and any other test that the surgeon has used while they are working on their weight loss trial.   Patient understands that they CAN NOT gain any weight during the weight loss trial.  Gaining weight will result in extra classes    Changes Made Since Last Class:   Sleeping better and no eating after 7:00 pm.    Eating Habits and Behaviors      Today we started off class talking about the Key Diet Principles. Patient was encouraged to start drinking 64 ounces of fluid per day. Patient was encouraged to start cutting out soda, caffeine, carbonation, sweet tea, fruit juice, and fruit smoothies. Patient is currently drinking 50 ounces of fluid, which consists of water, Crystal light. Patient was also instructed to fill up on meat, fish, vegetables, eggs, cheese, and some fruit.   We also talked about protein drinks and patient was encouraged to start trying these, using them either for a meal replacement or a substitute for a current meal, which may be higher in carbohydrates. We talked about ways to lower carbohydrates and start trying substitutes, such as zucchini noodles and cauliflower rice. Patient's current diet habits include:   3 meals consisting of eggs, protein, vegetables, starch and occasional snacks. Physical Activity/Exercise    Comments:     Currently for exercise, patient is walking and lifting. We talked about activities for patient to do, including walking, swimming, or chair exercises. I also talked with patient about doing some strength training, which helps the metabolism, as well. Goals have been set. Behavior Modification       Comments:   I also gave a handout that included  Behavior Change ideas and its impact on Weight Loss. Some of the suggestions in the power point included food journaling. Patient was also given some strategies to follow, such as cooking just enough for the meal and not putting serving bowls on the table. Patient was also encouraged to restrict where they are eating and to avoid mindless eating throughout the day. One goal that patient has set for next month is:  Decrease carbs.     Terence Hwang RD

## 2020-03-16 ENCOUNTER — DOCUMENTATION ONLY (OUTPATIENT)
Dept: SURGERY | Age: 30
End: 2020-03-16

## 2020-03-17 NOTE — PROGRESS NOTES
Nutrition Evaluation    Patient's Name: Jose Klein   Age: 26 y.o. YOB: 1990   Sex: female    Height: 5'7\" Weight: 277 BMI:  43  Starting Weight:  270#        Smoking Status:  No  Alcohol Intake:    Patient does not drink at all. Changes made during classes include:  More vitamins. Portion control. More water and protein drinks. Two things that patient learned during this weight loss trial:  To read food labels. Self control. Summary:  I feel that Jose Klein has demonstrated appropriate diet changes and is ready to move forward with surgery. Patient has been briefed on the importance of the protein drinks, vitamins, and the transition of the diet stages. Patient understands that the long-term diet will focus on protein and vegetables. Patient understand the effects of carbohydrates after surgery and what reactive hypoglycemia is. Patient is aware that they will be attending pre-op class 2 weeks before surgery and will get more detailed information on the post-op diet guidelines. Patient will see me again at 6 weeks post-op. At this 6 week visit, RD will assess how patient is tolerating soft protein and advance to vegetables, if tolerating soft protein without difficulty. Patient will also see RD again at 9 months post-op. This visit will assess patient's compliance with current protocol, including diet, vitamins, protein shakes, and exercise. Post-op diet guidelines will be reinforced. RD is available for questions and to meet with patient outside of the 6 week and 9 month post-op visit. We spent a lot of time talking about the vitamins. Patient understands the importance of being compliant with the diet protocol and the complications and risks that can occur if they are non-compliant with the nutritional protocol. Patient has attended the November 14, 2019 Support Group.     Candidate for surgery: Yes    Rosas Hill RD  3/16/2020

## 2020-03-17 NOTE — PROGRESS NOTES
[facility] [address]  [place], [place]    Patient's Name: Jose Simental   Age: 26 y.o. YOB: 1990   Sex: female    Date: 03/11/2020    Session: 6 of  6  Surgeon:  Dr. Rose Faustin    Height: 5'7\" Weight:    270      Lbs. Starting Weight: 277    Lbs. BMI: 43    Do you smoke? no    Alcohol intake:    I do not drink at all. Class Guidelines    Guidelines are reviewed with patient at the start of every class. 1. Patient understands that weight loss trial classes must be consecutive. Patient understands if they miss a class, it is their responsibility to contact me to reschedule class. I will reach out to patient after their first no show. 2.  Patient understands the expectations that weight maintenance/weight loss is expected during the classes. Failure to demonstrate changes may result in one extra month of weight loss trial, followed by going back to see the surgeon. Patient understands that they CAN NOT gain any weight during the weight loss trial.  Gaining weight will result in extra classes. 3. Patient is also instructed to be doing their labs, blood work, psych visit, support group and any other test that the surgeon has used while they are working on their weight loss trial.  4.  Patient was instructed to bring their blue binder to every class and appointment. Changes Made Since Last Class:   Portion control. Eating Habits and Behaviors    Today in class, we reviewed the key diet principles. I have talked to patient about pushing the fluid and working towards 64 ounces per day. Patient was given ideas of liquids that would be okay. Patient was encouraged to cut out liquid calories, such as soda and sweet tea. We talked about the reasons that sugar sweetened beverages can promote weight gain. Sugar is highly palatable.   Excessive consumption of sugar can trigger an exaggerated release of dopamine, which can promote a compulsive drive to consume more sugar sweetened beverages. Also, satiety is not reached with liquid calories the same way it does with solid calories. In class, we also talked about focusing on protein and low carbohydrates. Patient was encouraged during the weight loss trial to keep their carbohydrate less than 100 grams per day and their protein level at 60-80 grams per day. We talked about meal choices and snack ideas. Patient's current diet habits include:   3 meals consisting of a protein, starch, vegetables and snacks. Physical Activity/Exercise    Comments: We talked about exercise. Patient was given reasons of why exercise is so important and how that can help with their long-term success. I have encouraged patient to get a support system to help with the activity. Currently for activity, patient is walking . Behavior Modification       Comments:  During today's lesson, I also spent some time talking about behavior changes. I talked to patient about the importance of taking vitamins post op and we reviewed the vitamins that patients will be taking post op. Patient will hear this again at pre op class before surgery. Patient had the opportunity to ask questions about these vitamins that will be lifelong. Goals that patient wants to work on includes:  Run 5 miles. Conclusion:  Patient has completed a 6 month WLT and managed to lose 7# by implementing what was learned in class. Patient is cleared to proceed from a nutritional perspective.     Trang Roberts RD  03/11/2020

## 2020-03-26 ENCOUNTER — TELEPHONE (OUTPATIENT)
Dept: SURGERY | Age: 30
End: 2020-03-26

## 2020-03-26 NOTE — TELEPHONE ENCOUNTER
Patient called wanted to know why she has not gotten a pre op clearance told patient she still needs endo clearance. Her A1C needs to be at a 8 was told that when she saw NP Martin Luna in January. She states there office is closed, but she will update us when she gets the clearance.

## 2020-03-26 NOTE — TELEPHONE ENCOUNTER
Called patient and went over requirements for weight loss program. She is scheduled to see Dr. Daniel Blue in June for mid trial

## 2020-11-04 ENCOUNTER — DOCUMENTATION ONLY (OUTPATIENT)
Dept: SURGERY | Age: 30
End: 2020-11-04

## 2020-11-04 NOTE — PROGRESS NOTES
Spoke with pt regarding Endo clearance, pt has been to see her Endocrinologist. Pt states that she has regained the weight and agreed to come in to see Emeka tomorrow at 3:30. Pt is still interested in having sx. Pt also stated that her A1C might have gone back up to around 10. Advised pt to check in with Endo as well to see about getting cleared so that once she gets her weight down she could possibly move forward with sx. Pt also stated that she had Covid as well back in March or April when the pandemic started. Just an FYI.

## 2020-11-05 ENCOUNTER — OFFICE VISIT (OUTPATIENT)
Dept: SURGERY | Age: 30
End: 2020-11-05
Payer: MEDICAID

## 2020-11-05 VITALS
HEART RATE: 112 BPM | WEIGHT: 293 LBS | BODY MASS INDEX: 45.99 KG/M2 | OXYGEN SATURATION: 96 % | HEIGHT: 67 IN | SYSTOLIC BLOOD PRESSURE: 149 MMHG | RESPIRATION RATE: 18 BRPM | DIASTOLIC BLOOD PRESSURE: 84 MMHG | TEMPERATURE: 97.1 F

## 2020-11-05 DIAGNOSIS — I10 ESSENTIAL HYPERTENSION: ICD-10-CM

## 2020-11-05 DIAGNOSIS — E11.9 CONTROLLED TYPE 2 DIABETES MELLITUS WITHOUT COMPLICATION, WITH LONG-TERM CURRENT USE OF INSULIN (HCC): ICD-10-CM

## 2020-11-05 DIAGNOSIS — A04.8 H. PYLORI INFECTION: ICD-10-CM

## 2020-11-05 DIAGNOSIS — Z79.4 CONTROLLED TYPE 2 DIABETES MELLITUS WITHOUT COMPLICATION, WITH LONG-TERM CURRENT USE OF INSULIN (HCC): ICD-10-CM

## 2020-11-05 DIAGNOSIS — E55.9 VITAMIN D DEFICIENCY: ICD-10-CM

## 2020-11-05 DIAGNOSIS — E66.01 MORBID OBESITY (HCC): Primary | ICD-10-CM

## 2020-11-05 PROCEDURE — 99212 OFFICE O/P EST SF 10 MIN: CPT | Performed by: NURSE PRACTITIONER

## 2020-11-05 RX ORDER — ATOMOXETINE 40 MG/1
CAPSULE ORAL
COMMUNITY
Start: 2020-11-03

## 2020-11-05 RX ORDER — BLOOD SUGAR DIAGNOSTIC
STRIP MISCELLANEOUS
COMMUNITY
Start: 2020-08-05

## 2020-11-05 RX ORDER — LAMOTRIGINE 25 MG/1
50 TABLET ORAL
COMMUNITY

## 2020-11-05 RX ORDER — HYDROXYZINE 25 MG/1
TABLET, FILM COATED ORAL
COMMUNITY

## 2020-11-05 NOTE — PROGRESS NOTES
Re Silverman is a 31 y.o. female  Chief Complaint   Patient presents with    Follow-up     midtrail  getting back on track         Is someone accompanying this pt? no    Is the patient using any DME equipment during OV? no        Vitals:    11/05/20 1541   BP: (!) 149/84   Pulse: (!) 112   Resp: 18   Temp: 97.1 °F (36.2 °C)   SpO2: 96%   Weight: 298 lb (135.2 kg)   Height: 5' 7\" (1.702 m)   Ms. Be Caicedo has been given the following recommendations today due to her elevated BP reading: referred to Alternative/PCP. Depression Screening:  3 most recent PHQ Screens 11/18/2019   Little interest or pleasure in doing things Not at all   Feeling down, depressed, irritable, or hopeless Not at all   Total Score PHQ 2 0       Learning Assessment:  Learning Assessment 10/7/2019   PRIMARY LEARNER Patient   PRIMARY LANGUAGE ENGLISH   LEARNER PREFERENCE PRIMARY READING   ANSWERED BY pt   RELATIONSHIP SELF         Fall Risk  No flowsheet data found. Health Maintenance reviewed and discussed and ordered per Provider. Coordination of Care:  1. Have you been to the ER, urgent care clinic since your last visit? Hospitalized since your last visit? no    2. Have you seen or consulted any other health care providers outside of the [address] [place] [address] since your last visit? Include any pap smears or colon screening.  no

## 2020-11-05 NOTE — Clinical Note
Follow up midtrail   Hari   Needs to lose weight pre op and have a1c rechecked otherwise good. Has to get to 277 or below and I have asked Vero Cevallos to help with her as well.

## 2020-11-05 NOTE — PROGRESS NOTES
Bariatric Preoperative Progress Note      Subjective:     Feli Soria is a 31 y.o. female who presents today for followup of their candidacy for bariatric surgery. It is due to the patient's severe obesity, which is further complicated by adult onset diabetes mellitus, hypertension, reactive airway disease and neuropathy  that the patient is now seeking out bariatric surgery. Since last seen, Feli Soria has been working through bariatric program towards gastric bypass with Dr. Chrissie Crabtree. Past Medical History:   Diagnosis Date    Anxiety     Anxiety     Asthma     Depression     Diabetes ([address]     Hypertension     Neuropathy     PTSD (post-traumatic stress disorder)      Past Surgical History:   Procedure Laterality Date    HX  SECTION  2013     Current Outpatient Medications   Medication Sig Dispense Refill    BYDUREON 2 mg/0.65 mL pnij       insulin lispro (HUMALOG U-100 INSULIN) 100 unit/mL injection by SubCUTAneous route.       albuterol (PROVENTIL VENTOLIN) 2.5 mg /3 mL (0.083 %) nebu albuterol sulfate 2.5 mg/3 mL (0.083 %) solution for nebulization      gabapentin (NEURONTIN) 100 mg capsule TAKE ONE CAPSULE BY MOUTH 3 TIMES A DAY  5    LANTUS SOLOSTAR U-100 INSULIN 100 unit/mL (3 mL) inpn INJECT 20 UNITS IN THE MORNING AND 30 UNITS IN THE EVENING  5    citalopram (CELEXA) 20 mg tablet       losartan (COZAAR) 100 mg tablet       omeprazole (PRILOSEC OTC) 20 mg tablet Prilosec OTC 20 mg tablet,delayed release   TAKE 1 TABLET BY MOUTH ONCE A DAY      metFORMIN ER (GLUCOPHAGE XR) 500 mg tablet TAKE 2 TABLETS BY MOUTH TWICE A DAY  3       Allergies   Allergen Reactions    Shellfish Derived Anaphylaxis     Review of Systems:  Positive in BOLD  CONST: Fever, weight loss, fatigue or chills  GI: Nausea, vomiting, abdominal pain, change in bowel habits, hematochezia, melena, and GERD   INTEG: Dermatitis, abnormal moles  HEENT: Recent changes in vision, vertigo, epistaxis, dysphagia and hoarseness  CV: Chest pain, palpitations, HTN, edema and varicosities  RESP: Cough, shortness of breath, wheezing, hemoptysis, snoring and reactive airway disease  : Hematuria, dysuria, frequency, urgency, nocturia and stress urinary incontinence   MS: Weakness, joint pain and arthritis  ENDO: Diabetes, thyroid disease, polyuria, polydipsia, polyphagia, poor wound healing, heat intolerance, cold intolerance  LYMPH/HEME: Anemia, bruising and history of blood transfusions  NEURO: Dizziness, headache, fainting, seizures and stroke, neuropathy  PSYCH: Anxiety and depression    Objective:     Physical Exam:  Visit Vitals  BP (!) 149/84   Pulse (!) 112   Temp 97.1 °F (36.2 °C)   Resp 18   Ht 5' 7\" (1.702 m)   Wt 135.2 kg (298 lb)   SpO2 96%   BMI 46.67 kg/m²       Physical Exam  Vitals signs and nursing note reviewed. Constitutional:       Appearance: She is obese. HENT:      Head: Normocephalic and atraumatic. Cardiovascular:      Rate and Rhythm: Normal rate. Pulmonary:      Effort: Pulmonary effort is normal.   Musculoskeletal: Normal range of motion. Skin:     General: Skin is warm and dry. Neurological:      Mental Status: She is alert and oriented to person, place, and time. Psychiatric:         Mood and Affect: Mood and affect normal.         Behavior: Behavior normal.         Studies to date:    Labs: significant for A1C 9.7, Glu 155, H Pylori +, Vit D def      EKG: Normal sinus rhythm   Cannot rule out Anterior infarct , age undetermined   Abnormal ECG   No previous ECGs available   Confirmed by Stevie Mckee ([id number]) on 1/2/2020 3:01:07 PM     Nutritional evaluation: 6/6, start weight 277lbs     Psychiatric evaluation: approved, 1/14/20 by Dr. Ronan Franklin: attended     Additional evaluations: needs weight loss back to consult weight     Assessment:   Any Cohen is a 31 y.o. female who is progressing through the bariatric preoperative evaluation. At this time, they are not an appropriate candidate for weight loss surgery until she gets back to 277lbs or below. Needs D work to stop skipping meals, dec carbs/snacking, and work to stop soda. Struggling after medication changes and Covid. Plan:   Vitamin D deficiency: Start Vit D 3 5000 units daily PO  A1c needs to be gaol of 8   H Pylori treated     -complete remainder of preop evaluation including weight loss. I feel the patient is lacking some basic diet principles and would like her to go back to the rd classes to ensure she can make the changes needed post op. We will be working on this weight loss together for up to 3 months with the expectation of weight loss during that time. Patient clearly understands that after 3 months she may be dis enrolled  from the program if weight loss cannot be achieved.        Rabia Haley, CARRIEP-BC

## 2020-11-06 ENCOUNTER — DOCUMENTATION ONLY (OUTPATIENT)
Dept: BARIATRICS/WEIGHT MGMT | Age: 30
End: 2020-11-06

## 2020-11-06 NOTE — PROGRESS NOTES
11/6/2020:  Contacted patient on Friday Nov. 6th @ 3:05 pm to schedule WLT get back on track appointment per [facility] request. Patient was left a voicemail to contact me. My phone number was provided.     Esther Matthews RD

## 2020-11-10 ENCOUNTER — HOSPITAL ENCOUNTER (OUTPATIENT)
Dept: BARIATRICS/WEIGHT MGMT | Age: 30
Discharge: HOME | End: 2020-11-10

## 2020-11-10 ENCOUNTER — DOCUMENTATION ONLY (OUTPATIENT)
Dept: BARIATRICS/WEIGHT MGMT | Age: 30
End: 2020-11-10

## 2020-11-10 NOTE — PROGRESS NOTES
[facility]  [id number] [place] [address], [address] [place] [address]    Patient's Name: Abdi Banerjee   Age: 31 y.o. YOB: 1990   Sex: female    Date:   11/10/2020    Insurance:  Sammi Cadena            Surgeon:  Irma Cabrera    Height: 67\" Weight:    297      Lbs. BMI: 46.5     Starting Weight: 277    Overall Pounds Lost: 0   Overall Pounds Gained: 20    Do you smoke? no    Alcohol intake:  0 Number of drinks at a time:  0  Number of times a week: 0    Class Guidelines    Guidelines are reviewed with patient at the start of every class. 1. Patient understands that weight loss trial classes must be consecutive. Patient understands if they miss a class, it is their responsibility to contact me to reschedule class. I will reach out to patient after their first no show. 2.  Patient understands the expectations that weight maintenance/weight loss is expected during the classes. Failure to demonstrate changes may result in one extra month of weight loss trial, followed by going back to see the surgeon. 3. Patient is also instructed to be doing their labs, blood work, psych visit, support group and any other test that the surgeon has used while they are working on their weight loss trial.    Other Pertinent Information: gained about 20 lbs, elevated A1C    Changes Made Since Last Class: starting back on premier (one/day whenever she cant eat a meal), started packing lunch, doesn't eat a lot of fruit      Dietary Instruction    During today's class we continued to focus on the key diet principles. Patient was instructed to follow a low carbohydrate diet, focusing on meat and vegetables. Patient was instructed to stop liquid calories and aim for 64 ounces of water per day. In class, I also gave patient a power point on surviving the holidays.   Some of the tips included survival tips for parties, including bringing their own low carbohydrate dish to a potluck dinner and surveying the buffet line before they start filling up their plate. Patient was given cooking alternatives, including using Splenda for sugar, substituting applesauce for oil in recipes, and using low fat plain yogurt instead of sour cream in dips. Patient was also encouraged to be mindful of calories in alcohol. Patient's diet habits include: snacking on almonds and terrance crackers, beans, beyond burger, eats chicken and fish typically for protein  Fluids: not drinking a lot of plain water, drinking a lot of diet lemonade and diet soda     Physical Activity/Exercise    Patient is currently doing walking during lunch break for activity. Today's power point on surviving the holidays also included tips on exercising. This included being creative during the holiday, walking stairs, mall walking, getting resistance bands. Patient was encouraged not to be afraid to excuse themselves from the table to go for a walk after they eat. Comments: deep cleaning of house    Behavior Modification  Reinforced behavior changes to make. Patient was encouraged to keep their emotions in check. Try to HALT and focus on whether they are eating out of hunger or if they are eating out of emotions. Other eating behaviors included surveying the buffet line before starting to fill up their plate. Patient was given a check off list and encouraged to monitor some of their eating behaviors, such as eating slowly, chewing their food thoroughly, and taking 20-30 minutes to eat a meal.    Goals that patient set for next month include:   Increase skipping meals for protein  Increase protein at snacks  Increase physical activity in 5-10 minute increments        Janette Chavez RD  11/10/2020

## 2020-12-09 ENCOUNTER — DOCUMENTATION ONLY (OUTPATIENT)
Dept: SURGERY | Age: 30
End: 2020-12-09

## 2020-12-11 ENCOUNTER — HOSPITAL ENCOUNTER (OUTPATIENT)
Dept: BARIATRICS/WEIGHT MGMT | Age: 30
Discharge: HOME | End: 2020-12-11

## 2020-12-11 NOTE — PROGRESS NOTES
[facility] [employer] [facility]  [facility], [address]    Patient's Name: Abimbola Sesay   Age: 31 y.o. YOB: 1990     Sex: female    Date:   12/11/2020    Insurance:  Medicaid          Surgeon:  Penny Becker    Height: 67\" Weight:    287      Lbs. BMI: 44.9    Previous Month's Weight: 297   Pounds Lost since last month: 10                 Pounds Gained since last month: 0    Starting Weight: 277 lbs       Overall Pounds Lost: 0   Overall Pounds Gained: 10      Do you smoke? no  Alcohol intake:  no  Number of drinks at a time:  0  Number of times a week: 0      Changes Made Since Last Class: more water, protein and more fiber - new diabetes medication       Dietary Instruction    Patient's diet habits include: Ounces of fluids/day: 4 bottles 16 oz  Fluids taken in: seltzer water and occasional diet soda  How many meals daily? 3 meals  What meals consist of --  Smaller plates- protein - fish, nuts, cheese, plant based, protein shakes, yogurt  Breakfast: protein shake or eggs   Carb sources: wheat bread, rice -- have tried cauliflower rice  Food struggling to let go: breads, seltzer ed water  Snacks: don't get hungry - not snacking   Protein shake in the morning - chocolate premier protein - 1/2     Physical Activity/Exercise    Patient is currently doing trying to do walking for activity. Comments:     Behavior Modification    In today's class, we reinforced and focused on behavior changes to make. Patient was encouraged to plan their meals and take time to pack a lunch or breakfast to stay on track. Patient was continually encouraged to monitor some of their eating behaviors, such as limiting their distractions while eating, not skipping meals, practice mindful eating and listening to their physical hunger and fullness cues.  Patient was also encouraged to track food intake, specifically carbohydrates and proteins through a food journal or a food tracking mino. No food journal. Tries to get three meals in daily and trying lower carb swaps.     Goals that patient set for next month include:  Keep lowering carb intake  Keep increasing water intake          Opal Keyes RD  12/11/2020

## 2021-01-22 ENCOUNTER — DOCUMENTATION ONLY (OUTPATIENT)
Dept: BARIATRICS/WEIGHT MGMT | Age: 31
End: 2021-01-22

## 2021-01-22 ENCOUNTER — HOSPITAL ENCOUNTER (OUTPATIENT)
Dept: BARIATRICS/WEIGHT MGMT | Age: 31
Discharge: HOME | End: 2021-01-22

## 2021-01-22 NOTE — PROGRESS NOTES
1/22/2021: Had a follow up meeting with patient. She is struggling to get these last 10 lbs off. She completed her WLT back in March, but has been working with me since Nov 2020. She said she's tried the liquid diet multiple times, which feels like a reset, but does struggle with it and is just feeling super discouraged at this point. Encouraged her to continue to do 1 protein shake every day, in place of lunch, and continue w/ getting 64 oz of SF fluids, + high protein meals and snacks. Also mentioned one of her new DM meds makes her a bit nauseous after taking and feels like it has caused a decrease in her appetite, but cannot understand why the weight is not budging.        Eddi Darling, RD